# Patient Record
Sex: FEMALE | Race: WHITE | URBAN - NONMETROPOLITAN AREA
[De-identification: names, ages, dates, MRNs, and addresses within clinical notes are randomized per-mention and may not be internally consistent; named-entity substitution may affect disease eponyms.]

---

## 2021-08-19 ENCOUNTER — OFFICE VISIT (OUTPATIENT)
Dept: ENT CLINIC | Age: 26
End: 2021-08-19
Payer: COMMERCIAL

## 2021-08-19 VITALS
WEIGHT: 145 LBS | HEART RATE: 96 BPM | HEIGHT: 65 IN | SYSTOLIC BLOOD PRESSURE: 134 MMHG | BODY MASS INDEX: 24.16 KG/M2 | RESPIRATION RATE: 14 BRPM | TEMPERATURE: 97.1 F | DIASTOLIC BLOOD PRESSURE: 70 MMHG

## 2021-08-19 DIAGNOSIS — H69.82 DYSFUNCTION OF LEFT EUSTACHIAN TUBE: Primary | ICD-10-CM

## 2021-08-19 DIAGNOSIS — J30.9 ALLERGIC RHINITIS, UNSPECIFIED SEASONALITY, UNSPECIFIED TRIGGER: ICD-10-CM

## 2021-08-19 DIAGNOSIS — H91.8X9 OTHER SPECIFIED FORMS OF HEARING LOSS, UNSPECIFIED LATERALITY: ICD-10-CM

## 2021-08-19 PROBLEM — J31.0 CHRONIC RHINITIS: Status: ACTIVE | Noted: 2021-08-19

## 2021-08-19 PROCEDURE — 99203 OFFICE O/P NEW LOW 30 MIN: CPT | Performed by: OTOLARYNGOLOGY

## 2021-08-19 RX ORDER — NORGESTIMATE AND ETHINYL ESTRADIOL 7DAYSX3 28
KIT ORAL
COMMUNITY
Start: 2021-08-11

## 2021-08-19 RX ORDER — FLUTICASONE PROPIONATE 50 MCG
1 SPRAY, SUSPENSION (ML) NASAL DAILY
Qty: 1 BOTTLE | Refills: 3 | Status: SHIPPED | OUTPATIENT
Start: 2021-08-19

## 2021-08-19 NOTE — PATIENT INSTRUCTIONS
Nasal and Sinus Irrigations (Isotonic Saline)  Purpose  Nasal and sinus irrigations help to remove crusts, dried blood, clots, mucus, etc. from  the nose. In addition, these irrigations may also decrease the swelling in the nasal and  sinus linings. These irrigations are often used on a long-term basis to keep the nose  and sinuses clean. You can purchase a bottle and saline packets at any pharmacy or  you can make your own (see below). Isotonic Saline  1 teaspoon of rhoda/pickling salt (not table salt)  1/2 teaspoon of baking soda  1 quart of water (tap water OK; bottled or distilled water OK; not well water)    Directions   Use a large piston-type syringe (also known as an irrigating syringe) or a nasal  squeeze bottle (see below). Avoid bulb-type syringes.  Place the tip of the syringe (or squeeze bottle) at the nostril and irrigate with  the saline mixture. The irrigation should be rigorous, since it cleans the nose  by mechanically removing debris, crusts, mucus, etc. The irrigation should be  forceful - like washing dirt from a driveway with water from a hose.  Irrigate each nostril with 1 cup of the saline mixture 3-4 times per day.  After each irrigation, simply wash the syringe (or squeeze bottle) with  ordinary tap water and mild soap. Let the syringe air-dry. Separate the barrel  and piston of the syringe for cleaning.  When the syringe (or squeeze bottle) becomes permanently soiled or if it  becomes difficult to use, replace it with a new one.  If using for ? 3 months, please dispense of your bottle and obtain a new one.

## 2021-08-19 NOTE — PROGRESS NOTES
[address] [place] [address] EAR, NOSE AND THROAT  [facility]  Dept: [phone]  Dept Fax: [phone]: [phone]       Dear Chris Arriaga MD, thank you for referring Kris Nieto in consultation for a chief complaint of: ETD . My full evaluation is as follows:     HPI:     As you recall, Kris Nieto is a 32 y.o. female who presents today for left-sided ear pressure. This has been present pretty consistently for the past 5 years. She is able to pop her ears and get momentary relief. She also notices some hearing loss in that ear, although it is mild. She denies any tinnitus. She has not had any recent ear infections. She also admits to occasional ear pain. She does grind her teeth, and thinks she has jaw joint issues. She has some nasal allergies, and uses an antihistaime occasionally. She has tried flonase in the past. She can breathe through her nose okay. History:     No Known Allergies  Current Outpatient Medications   Medication Sig Dispense Refill    Norgestim-Eth Estrad Triphasic 0.18/0.215/0.25 MG-35 MCG TABS take 1 tablet by mouth once daily as directed      LORATADINE PO Take by mouth as needed      fluticasone (FLONASE) 50 MCG/ACT nasal spray 1 spray by Nasal route daily 1 Bottle 3     No current facility-administered medications for this visit. History reviewed. No pertinent past medical history. History reviewed. No pertinent surgical history. Family History   Problem Relation Age of Onset    Hypertension Mother      Social History     Tobacco Use    Smoking status: Never Smoker    Smokeless tobacco: Never Used   Substance Use Topics    Alcohol use: Not Currently       All of the past medical history, past surgical history, family history,social history, allergies and current medications were reviewed with the patient.     Review of Systems      A complete review of systems was obtained by the patient intake form, which is attached to this visit. Objective:   /70 (Site: Left Upper Arm, Position: Sitting)   Pulse 96   Temp 97.1 °F (36.2 °C) (Infrared)   Resp 14   Ht 5' 5\" (1.651 m)   Wt 145 lb (65.8 kg)   BMI 24.13 kg/m²     PHYSICAL EXAM  ABNORMAL OTOLARYNGOLOGIC EXAM FINDINGS: thick clear mucous, BITH     OTHER PERTINENT EXAM FINDINGS:  GENERAL: Awake, NAD, Non-toxic appearing. Normal voice quality  NEUROLOGICAL: GCS 15, Cranial nerves II-VI, VIII-XII grossly intact,  Facial nerve (VII) w/ House-Brackman Grade 1 of 6 bilaterally, No evidence of nystagmus or gross asymmetry    EARS: Pinna well-formed,  EAC non-stenotic w/o cerumen impaction AU,  TM's intact AU w/o effusion or active infection appreciated  EYES: EOMI, No ptosis appreciated   NOSE: Dorsum w/o scar or deformity,  No mucopurulence appreciated, Patent nasal airways bilaterally. No inferior turbinate hypertrophy. No septal deviation. ORAL CAVITY: No mucosal masses/lesions appreciated, Tongue with FROM. Appropriate DEONNA w/o trismus. OROPHARYNX: No mucosal masses or lesions appreciated. Symmetric palatal elevation w/o draping. NECK: Soft, supple. No crepitus or masses appreciated,  Trachea midline. No thyromegaly or thyroid tenderness. Otomicroscopic Exam  Procedure performed: Bilateral Otomicroscopy   Attending: Baldo Tsang MD  Findings:  Right:  Pinna normal.  Otomicroscopic exam: external auditory canal clear, tympanic membrane is intact, middle ear aerated. Left: Pinna normal.  Otomicroscopic exam: external auditory canal clear, tympanic membrane is intact, middle ear aerated. Procedure: The patient was taken to the procedure room and laid supine on the table. The microscope was brought over the patient's right ear. A speculum was used to visualize the external auditory canal.  The above findings were noted.   The microscope was then moved to the patient's left ear and the same procedure was performed. The patient tolerated the procedure well. There were no complications. Data: The relevant prior clinic notes were reviewed today, including the referring physicians notes. Imaging: None       Assessment & Plan   Christ Gardner is a 32 y.o. female with:   1. Dysfunction of left eustachian tube    2. Allergic rhinitis, unspecified seasonality, unspecified trigger    3. Other specified forms of hearing loss, unspecified laterality         She has rhinitis, most likely related to allergies, causing ETD and a hearing loss. We discussed treatment options, including allergy testing, and empiric treatment. She elected to begin Flonase 2 sprays daily. We also discussed nasal saline irrigation. I have ordered an audiogram/tympanogram to further assess her hearing loss. I will see her back in 2 months to assess treatment response. Return in about 2 months (around 10/19/2021). Thank you for involving me in this patient's care. Please do not hesitate to call with any questions or concerns. Sincerely,    Patrick Oconnor M.D. Otolaryngology-Head and Neck Surgery    **This report has been created using voice recognition software. It may contain minor errors which are inherent in voice recognition technology. **

## 2021-09-16 ENCOUNTER — HOSPITAL ENCOUNTER (OUTPATIENT)
Dept: AUDIOLOGY | Age: 26
Discharge: HOME | End: 2021-09-16
Admitting: OTOLARYNGOLOGY
Payer: COMMERCIAL

## 2021-09-16 PROCEDURE — 92567 TYMPANOMETRY: CPT | Performed by: AUDIOLOGIST

## 2021-09-16 PROCEDURE — 92557 COMPREHENSIVE HEARING TEST: CPT | Performed by: AUDIOLOGIST

## 2021-09-16 NOTE — PROGRESS NOTES
AUDIOLOGICAL EVALUATION      REASON FOR TESTING:  Audiometric evaluation at the request of Dr. Petey Mosqueda due to patient complaints of aural fullness and possible hearing loss, worse in the left ear. The patient describes left-sided aural fullness occurring daily for approximately the past 5 years. Occasionally, the pressure is more severe and accompanied by a headache on the left side. She has started taking flonase daily for allergies but denies any changes in aural fullness. She denies any symptoms of otalgia, otorrrhea, tinnitus, vertigo, history of ear infections or previous ear surgery. She denies any family history of hearing loss or loud noise exposure. OTOSCOPY: WNL, bilaterally. AUDIOGRAM            Reliability: Good  Audiometer Used:  [id number]        COMMENTS: Hearing within normal limits, bilaterally. Speech reception thresholds consistent with pure tone averages, bilaterally. Speech discrimination ability is excellent, bilaterally, with speech presented at levels softer than average conversation in quiet. Tympanometry revealed normal peak pressure and normal middle ear compliance for both ears. RECOMMENDATION(S):   1. Follow up with referring provider, as planned. 2. Repeat audiogram as medically indicated or with any significant changes or concerns.

## 2023-07-12 ENCOUNTER — HOSPITAL ENCOUNTER (EMERGENCY)
Age: 28
Discharge: HOME | End: 2023-07-12
Payer: COMMERCIAL

## 2023-07-12 VITALS
DIASTOLIC BLOOD PRESSURE: 91 MMHG | OXYGEN SATURATION: 99 % | BODY MASS INDEX: 24.41 KG/M2 | HEART RATE: 75 BPM | SYSTOLIC BLOOD PRESSURE: 135 MMHG | RESPIRATION RATE: 16 BRPM | HEIGHT: 64 IN | TEMPERATURE: 99.3 F | WEIGHT: 143 LBS

## 2023-07-12 DIAGNOSIS — M26.622 ARTHRALGIA OF LEFT TEMPOROMANDIBULAR JOINT: Primary | ICD-10-CM

## 2023-07-12 DIAGNOSIS — J02.9 ACUTE PHARYNGITIS, UNSPECIFIED ETIOLOGY: ICD-10-CM

## 2023-07-12 LAB — S PYO AG THROAT QL: NEGATIVE

## 2023-07-12 PROCEDURE — 99213 OFFICE O/P EST LOW 20 MIN: CPT

## 2023-07-12 PROCEDURE — 99213 OFFICE O/P EST LOW 20 MIN: CPT | Performed by: NURSE PRACTITIONER

## 2023-07-12 PROCEDURE — 87651 STREP A DNA AMP PROBE: CPT

## 2023-07-12 RX ORDER — PREDNISONE 20 MG/1
40 TABLET ORAL DAILY
Qty: 14 TABLET | Refills: 0 | Status: SHIPPED | OUTPATIENT
Start: 2023-07-12 | End: 2023-07-19

## 2023-07-12 ASSESSMENT — PAIN DESCRIPTION - DESCRIPTORS: DESCRIPTORS: ACHING

## 2023-07-12 ASSESSMENT — PAIN DESCRIPTION - PAIN TYPE: TYPE: ACUTE PAIN

## 2023-07-12 ASSESSMENT — ENCOUNTER SYMPTOMS
SORE THROAT: 1
COUGH: 0
NAUSEA: 0
SHORTNESS OF BREATH: 0
VOMITING: 0

## 2023-07-12 ASSESSMENT — PAIN DESCRIPTION - FREQUENCY: FREQUENCY: CONTINUOUS

## 2023-07-12 ASSESSMENT — PAIN DESCRIPTION - ORIENTATION: ORIENTATION: LEFT

## 2023-07-12 ASSESSMENT — PAIN SCALES - GENERAL: PAINLEVEL_OUTOF10: 5

## 2023-07-12 ASSESSMENT — PAIN - FUNCTIONAL ASSESSMENT
PAIN_FUNCTIONAL_ASSESSMENT: ACTIVITIES ARE NOT PREVENTED
PAIN_FUNCTIONAL_ASSESSMENT: 0-10

## 2023-07-12 ASSESSMENT — PAIN DESCRIPTION - LOCATION: LOCATION: THROAT

## 2023-07-12 NOTE — ED PROVIDER NOTES
[address]  Urgent Care Encounter       CHIEF COMPLAINT       Chief Complaint   Patient presents with    Pharyngitis       Nurses Notes reviewed and I agree except as noted in the HPI. HISTORY OF [address] Vyas is a 32 y.o. female who presents for evaluation of sore throat, left ear pressure and pain in the left side of her mouth. Patient states that this been ongoing for the past 2 days. She denies any fever, chills, nausea, or vomiting. She denies any known sick exposures or any medications at home for the symptoms. The history is provided by the patient. REVIEW OF SYSTEMS     Review of Systems   Constitutional:  Negative for chills and fever. HENT:  Positive for ear pain, mouth sores and sore throat. Negative for congestion and ear discharge. Respiratory:  Negative for cough and shortness of breath. Cardiovascular:  Negative for chest pain. Gastrointestinal:  Negative for nausea and vomiting. Musculoskeletal:  Negative for arthralgias and myalgias. Skin:  Negative for rash. Allergic/Immunologic: Negative for immunocompromised state. Neurological:  Negative for headaches. PAST MEDICAL HISTORY         Diagnosis Date    Anxiety     Depression     IBS (irritable bowel syndrome)        SURGICALHISTORY     Patient  has no past surgical history on file. CURRENT MEDICATIONS       Previous Medications    HYDROXYZINE HCL PO    Take by mouth    MULTIPLE VITAMIN (MULTI-VITAMIN PO)    Take by mouth    NORGESTIM-ETH ESTRAD TRIPHASIC 0.18/0.215/0.25 MG-35 MCG TABS    take 1 tablet by mouth once daily as directed    SERTRALINE HCL PO    Take by mouth    VITAMIN D PO    Take by mouth       ALLERGIES     Patient is has No Known Allergies. Patients   There is no immunization history on file for this patient. FAMILY HISTORY     Patient's family history includes Hypertension in her mother.     SOCIAL HISTORY     Patient  reports that she has

## 2023-07-12 NOTE — ED TRIAGE NOTES
To room with c/o left sided sore throat and mouth pain that started Monday. Tuesday she felt like she was going to pass out.

## 2024-01-24 ENCOUNTER — NURSE ONLY (OUTPATIENT)
Dept: LAB | Age: 29
End: 2024-01-24

## 2024-02-19 LAB — CYTOLOGY THIN PREP PAP: NORMAL

## 2025-08-13 ENCOUNTER — APPOINTMENT (OUTPATIENT)
Dept: CT IMAGING | Age: 30
End: 2025-08-13
Payer: COMMERCIAL

## 2025-08-13 ENCOUNTER — HOSPITAL ENCOUNTER (EMERGENCY)
Age: 30
Discharge: HOME | End: 2025-08-13
Payer: COMMERCIAL

## 2025-08-13 VITALS
DIASTOLIC BLOOD PRESSURE: 91 MMHG | SYSTOLIC BLOOD PRESSURE: 135 MMHG | RESPIRATION RATE: 18 BRPM | BODY MASS INDEX: 28.67 KG/M2 | WEIGHT: 167 LBS | TEMPERATURE: 98.6 F | OXYGEN SATURATION: 100 % | HEART RATE: 78 BPM

## 2025-08-13 DIAGNOSIS — N83.201 RIGHT OVARIAN CYST: ICD-10-CM

## 2025-08-13 DIAGNOSIS — R10.31 RIGHT LOWER QUADRANT ABDOMINAL PAIN: Primary | ICD-10-CM

## 2025-08-13 LAB
ALBUMIN SERPL BCG-MCNC: 4 G/DL (ref 3.4–4.9)
ALP SERPL-CCNC: 75 U/L (ref 38–126)
ALT SERPL W/O P-5'-P-CCNC: 13 U/L (ref 10–35)
ANION GAP SERPL CALC-SCNC: 12 MEQ/L (ref 8–16)
AST SERPL-CCNC: 15 U/L (ref 10–35)
B-HCG SERPL QL: NEGATIVE
BASOPHILS ABSOLUTE: 0 THOU/MM3 (ref 0–0.1)
BASOPHILS NFR BLD AUTO: 0.3 %
BILIRUB CONJ SERPL-MCNC: < 0.1 MG/DL (ref 0–0.2)
BILIRUB SERPL-MCNC: < 0.2 MG/DL (ref 0.3–1.2)
BILIRUB UR QL STRIP.AUTO: NEGATIVE
BILIRUB UR STRIP.AUTO-MCNC: NEGATIVE MG/DL
BUN SERPL-MCNC: 9 MG/DL (ref 8–23)
CALCIUM SERPL-MCNC: 9.1 MG/DL (ref 8.5–10.5)
CHARACTER UR: CLEAR
CHARACTER UR: CLEAR
CHLORIDE SERPL-SCNC: 100 MEQ/L (ref 98–111)
CO2 SERPL-SCNC: 25 MEQ/L (ref 22–29)
COLOR, UA: YELLOW
COLOR, UA: YELLOW
CREAT SERPL-MCNC: 0.6 MG/DL (ref 0.5–0.9)
DEPRECATED RDW RBC AUTO: 43.6 FL (ref 35–45)
EOSINOPHIL NFR BLD AUTO: 1.6 %
EOSINOPHILS ABSOLUTE: 0.2 THOU/MM3 (ref 0–0.4)
ERYTHROCYTE [DISTWIDTH] IN BLOOD BY AUTOMATED COUNT: 13.4 % (ref 11.5–14.5)
GFR SERPL CREATININE-BSD FRML MDRD: > 90 ML/MIN/1.73M2
GLUCOSE SERPL-MCNC: 89 MG/DL (ref 74–109)
GLUCOSE UR QL STRIP.AUTO: NEGATIVE MG/DL
GLUCOSE UR QL STRIP.AUTO: NEGATIVE MG/DL
HCT VFR BLD AUTO: 40.9 % (ref 37–47)
HGB BLD-MCNC: 12.8 GM/DL (ref 12–16)
HGB UR QL STRIP.AUTO: NEGATIVE
IMM GRANULOCYTES # BLD AUTO: 0.07 THOU/MM3 (ref 0–0.07)
IMM GRANULOCYTES NFR BLD AUTO: 0.5 %
KETONES UR QL STRIP.AUTO: NEGATIVE
KETONES UR QL STRIP.AUTO: NEGATIVE
LIPASE SERPL-CCNC: 44 U/L (ref 13–60)
LYMPHOCYTES ABSOLUTE: 3.5 THOU/MM3 (ref 1–4.8)
LYMPHOCYTES NFR BLD AUTO: 24.4 %
MCH RBC QN AUTO: 27.6 PG (ref 26–33)
MCHC RBC AUTO-ENTMCNC: 31.3 GM/DL (ref 32.2–35.5)
MCV RBC AUTO: 88.3 FL (ref 81–99)
MONOCYTES ABSOLUTE: 0.8 THOU/MM3 (ref 0.4–1.3)
MONOCYTES NFR BLD AUTO: 5.8 %
NEUTROPHILS ABSOLUTE: 9.6 THOU/MM3 (ref 1.8–7.7)
NEUTROPHILS NFR BLD AUTO: 67.4 %
NITRITE UR QL STRIP.AUTO: NEGATIVE
NITRITE UR QL STRIP: NEGATIVE
NRBC BLD AUTO-RTO: 0 /100 WBC
OSMOLALITY SERPL CALC.SUM OF ELEC: 272 MOSMOL/KG (ref 275–300)
PH UR STRIP.AUTO: 5.5 [PH] (ref 5–9)
PH UR STRIP.AUTO: 6 [PH] (ref 5–9)
PLATELET # BLD AUTO: 285 THOU/MM3 (ref 130–400)
PMV BLD AUTO: 9.9 FL (ref 9.4–12.4)
POTASSIUM SERPL-SCNC: 3.6 MEQ/L (ref 3.5–5.2)
PROT SERPL-MCNC: 7.5 G/DL (ref 6.4–8.3)
PROT UR STRIP.AUTO-MCNC: NEGATIVE MG/DL
PROT UR STRIP.AUTO-MCNC: NEGATIVE MG/DL
RBC # BLD AUTO: 4.63 MILL/MM3 (ref 4.2–5.4)
RBC #/AREA URNS HPF: NEGATIVE /[HPF]
SODIUM SERPL-SCNC: 137 MEQ/L (ref 135–145)
SP GR UR REFRACT.AUTO: 1 (ref 1–1.03)
SP GR UR STRIP.AUTO: 1.02 (ref 1–1.03)
UROBILINOGEN, URINE: 0.2 EU/DL (ref 0.2–1)
UROBILINOGEN, URINE: 0.2 EU/DL (ref 0–1)
WBC # BLD AUTO: 14.3 THOU/MM3 (ref 4.8–10.8)
WBC #/AREA URNS HPF: NEGATIVE /[HPF]
WBC #/AREA URNS HPF: NEGATIVE /[HPF]

## 2025-08-13 PROCEDURE — 85025 COMPLETE CBC W/AUTO DIFF WBC: CPT

## 2025-08-13 PROCEDURE — 74177 CT ABD & PELVIS W/CONTRAST: CPT

## 2025-08-13 PROCEDURE — 82248 BILIRUBIN DIRECT: CPT

## 2025-08-13 PROCEDURE — 83690 ASSAY OF LIPASE: CPT

## 2025-08-13 PROCEDURE — 84703 CHORIONIC GONADOTROPIN ASSAY: CPT

## 2025-08-13 PROCEDURE — 99215 OFFICE O/P EST HI 40 MIN: CPT

## 2025-08-13 PROCEDURE — 81003 URINALYSIS AUTO W/O SCOPE: CPT

## 2025-08-13 PROCEDURE — 80053 COMPREHEN METABOLIC PANEL: CPT

## 2025-08-13 PROCEDURE — 6360000004 HC RX CONTRAST MEDICATION: Performed by: NURSE PRACTITIONER

## 2025-08-13 PROCEDURE — 36415 COLL VENOUS BLD VENIPUNCTURE: CPT

## 2025-08-13 PROCEDURE — 99285 EMERGENCY DEPT VISIT HI MDM: CPT

## 2025-08-13 PROCEDURE — 99214 OFFICE O/P EST MOD 30 MIN: CPT

## 2025-08-13 RX ORDER — IOPAMIDOL 755 MG/ML
80 INJECTION, SOLUTION INTRAVASCULAR
Status: COMPLETED | OUTPATIENT
Start: 2025-08-13 | End: 2025-08-13

## 2025-08-13 RX ORDER — ETODOLAC 300 MG/1
300 CAPSULE ORAL EVERY 8 HOURS
Qty: 30 CAPSULE | Refills: 0 | Status: SHIPPED | OUTPATIENT
Start: 2025-08-13

## 2025-08-13 RX ADMIN — IOPAMIDOL 80 ML: 755 INJECTION, SOLUTION INTRAVENOUS at 21:19

## 2025-08-13 ASSESSMENT — ENCOUNTER SYMPTOMS
VOMITING: 0
RESPIRATORY NEGATIVE: 1
NAUSEA: 0
ABDOMINAL PAIN: 1
DIARRHEA: 0
CONSTIPATION: 0

## 2025-08-13 ASSESSMENT — PAIN DESCRIPTION - ORIENTATION: ORIENTATION: LOWER

## 2025-08-13 ASSESSMENT — PAIN - FUNCTIONAL ASSESSMENT
PAIN_FUNCTIONAL_ASSESSMENT: 0-10
PAIN_FUNCTIONAL_ASSESSMENT: 0-10

## 2025-08-13 ASSESSMENT — PAIN SCALES - GENERAL
PAINLEVEL_OUTOF10: 6
PAINLEVEL_OUTOF10: 5

## 2025-08-13 ASSESSMENT — PAIN DESCRIPTION - LOCATION
LOCATION: ABDOMEN
LOCATION: ABDOMEN

## 2025-08-13 ASSESSMENT — PAIN DESCRIPTION - DESCRIPTORS: DESCRIPTORS: PRESSURE;SHARP

## 2025-08-13 ASSESSMENT — PAIN DESCRIPTION - PAIN TYPE: TYPE: ACUTE PAIN
